# Patient Record
Sex: MALE | Race: WHITE | NOT HISPANIC OR LATINO | Employment: OTHER | URBAN - METROPOLITAN AREA
[De-identification: names, ages, dates, MRNs, and addresses within clinical notes are randomized per-mention and may not be internally consistent; named-entity substitution may affect disease eponyms.]

---

## 2023-04-18 ENCOUNTER — APPOINTMENT (OUTPATIENT)
Dept: LAB | Facility: LAB | Age: 73
End: 2023-04-18
Payer: MEDICARE

## 2023-04-18 LAB
ALANINE AMINOTRANSFERASE (SGPT) (U/L) IN SER/PLAS: 26 U/L (ref 10–52)
ALBUMIN (G/DL) IN SER/PLAS: 4.2 G/DL (ref 3.4–5)
ALKALINE PHOSPHATASE (U/L) IN SER/PLAS: 101 U/L (ref 33–136)
ANION GAP IN SER/PLAS: 12 MMOL/L (ref 10–20)
ASPARTATE AMINOTRANSFERASE (SGOT) (U/L) IN SER/PLAS: 30 U/L (ref 9–39)
BILIRUBIN TOTAL (MG/DL) IN SER/PLAS: 0.9 MG/DL (ref 0–1.2)
C REACTIVE PROTEIN (MG/L) IN SER/PLAS: 0.55 MG/DL
CALCIUM (MG/DL) IN SER/PLAS: 9 MG/DL (ref 8.6–10.3)
CARBON DIOXIDE, TOTAL (MMOL/L) IN SER/PLAS: 30 MMOL/L (ref 21–32)
CHLORIDE (MMOL/L) IN SER/PLAS: 104 MMOL/L (ref 98–107)
CREATININE (MG/DL) IN SER/PLAS: 0.91 MG/DL (ref 0.5–1.3)
ERYTHROCYTE DISTRIBUTION WIDTH (RATIO) BY AUTOMATED COUNT: 14.1 % (ref 11.5–14.5)
ERYTHROCYTE MEAN CORPUSCULAR HEMOGLOBIN CONCENTRATION (G/DL) BY AUTOMATED: 30.9 G/DL (ref 32–36)
ERYTHROCYTE MEAN CORPUSCULAR VOLUME (FL) BY AUTOMATED COUNT: 96 FL (ref 80–100)
ERYTHROCYTES (10*6/UL) IN BLOOD BY AUTOMATED COUNT: 4.87 X10E12/L (ref 4.5–5.9)
GFR MALE: 90 ML/MIN/1.73M2
GLUCOSE (MG/DL) IN SER/PLAS: 78 MG/DL (ref 74–99)
HEMATOCRIT (%) IN BLOOD BY AUTOMATED COUNT: 46.9 % (ref 41–52)
HEMOGLOBIN (G/DL) IN BLOOD: 14.5 G/DL (ref 13.5–17.5)
LEUKOCYTES (10*3/UL) IN BLOOD BY AUTOMATED COUNT: 4.9 X10E9/L (ref 4.4–11.3)
PLATELETS (10*3/UL) IN BLOOD AUTOMATED COUNT: 128 X10E9/L (ref 150–450)
POTASSIUM (MMOL/L) IN SER/PLAS: 4.1 MMOL/L (ref 3.5–5.3)
PROTEIN TOTAL: 7.1 G/DL (ref 6.4–8.2)
SEDIMENTATION RATE, ERYTHROCYTE: 32 MM/H (ref 0–20)
SODIUM (MMOL/L) IN SER/PLAS: 142 MMOL/L (ref 136–145)
UREA NITROGEN (MG/DL) IN SER/PLAS: 20 MG/DL (ref 6–23)

## 2023-10-12 ENCOUNTER — LAB (OUTPATIENT)
Dept: LAB | Facility: LAB | Age: 73
End: 2023-10-12
Payer: MEDICARE

## 2023-10-12 DIAGNOSIS — E78.5 DYSLIPIDEMIA: Primary | ICD-10-CM

## 2023-10-12 DIAGNOSIS — E78.5 DYSLIPIDEMIA: ICD-10-CM

## 2023-10-12 LAB
CHOLEST SERPL-MCNC: 126 MG/DL (ref 0–199)
CHOLESTEROL/HDL RATIO: 3.3
HDLC SERPL-MCNC: 38.6 MG/DL
LDLC SERPL CALC-MCNC: 62 MG/DL
NON HDL CHOLESTEROL: 87 MG/DL (ref 0–149)
TRIGL SERPL-MCNC: 127 MG/DL (ref 0–149)
VLDL: 25 MG/DL (ref 0–40)

## 2023-10-12 PROCEDURE — 80061 LIPID PANEL: CPT

## 2023-10-12 PROCEDURE — 36415 COLL VENOUS BLD VENIPUNCTURE: CPT

## 2023-10-13 RX ORDER — PRAVASTATIN SODIUM 20 MG/1
20 TABLET ORAL DAILY
Qty: 90 TABLET | Refills: 3 | Status: SHIPPED | OUTPATIENT
Start: 2023-10-13

## 2024-01-24 ENCOUNTER — LAB (OUTPATIENT)
Dept: LAB | Facility: LAB | Age: 74
End: 2024-01-24
Payer: MEDICARE

## 2024-01-24 ENCOUNTER — OFFICE VISIT (OUTPATIENT)
Dept: RHEUMATOLOGY | Facility: CLINIC | Age: 74
End: 2024-01-24
Payer: MEDICARE

## 2024-01-24 VITALS
BODY MASS INDEX: 28.58 KG/M2 | WEIGHT: 211 LBS | SYSTOLIC BLOOD PRESSURE: 169 MMHG | HEART RATE: 74 BPM | DIASTOLIC BLOOD PRESSURE: 85 MMHG | HEIGHT: 72 IN

## 2024-01-24 DIAGNOSIS — M19.90 INFLAMMATORY ARTHRITIS: ICD-10-CM

## 2024-01-24 DIAGNOSIS — Z79.899 ENCOUNTER FOR LONG-TERM (CURRENT) USE OF HIGH-RISK MEDICATION: ICD-10-CM

## 2024-01-24 DIAGNOSIS — M19.90 INFLAMMATORY ARTHRITIS: Primary | ICD-10-CM

## 2024-01-24 LAB
ALBUMIN SERPL BCP-MCNC: 4.4 G/DL (ref 3.4–5)
ALP SERPL-CCNC: 84 U/L (ref 33–136)
ALT SERPL W P-5'-P-CCNC: 26 U/L (ref 10–52)
ANION GAP SERPL CALC-SCNC: 12 MMOL/L (ref 10–20)
AST SERPL W P-5'-P-CCNC: 31 U/L (ref 9–39)
BILIRUB SERPL-MCNC: 0.6 MG/DL (ref 0–1.2)
BUN SERPL-MCNC: 24 MG/DL (ref 6–23)
CALCIUM SERPL-MCNC: 9.1 MG/DL (ref 8.6–10.3)
CHLORIDE SERPL-SCNC: 105 MMOL/L (ref 98–107)
CO2 SERPL-SCNC: 30 MMOL/L (ref 21–32)
CREAT SERPL-MCNC: 0.91 MG/DL (ref 0.5–1.3)
CRP SERPL-MCNC: 1.08 MG/DL
EGFRCR SERPLBLD CKD-EPI 2021: 90 ML/MIN/1.73M*2
ERYTHROCYTE [DISTWIDTH] IN BLOOD BY AUTOMATED COUNT: 14.7 % (ref 11.5–14.5)
ERYTHROCYTE [SEDIMENTATION RATE] IN BLOOD BY WESTERGREN METHOD: 13 MM/H (ref 0–20)
GLUCOSE SERPL-MCNC: 81 MG/DL (ref 74–99)
HCT VFR BLD AUTO: 46 % (ref 41–52)
HGB BLD-MCNC: 14 G/DL (ref 13.5–17.5)
MCH RBC QN AUTO: 30.7 PG (ref 26–34)
MCHC RBC AUTO-ENTMCNC: 30.4 G/DL (ref 32–36)
MCV RBC AUTO: 101 FL (ref 80–100)
NRBC BLD-RTO: 0 /100 WBCS (ref 0–0)
PLATELET # BLD AUTO: 131 X10*3/UL (ref 150–450)
POTASSIUM SERPL-SCNC: 3.9 MMOL/L (ref 3.5–5.3)
PROT SERPL-MCNC: 6.9 G/DL (ref 6.4–8.2)
RBC # BLD AUTO: 4.56 X10*6/UL (ref 4.5–5.9)
SODIUM SERPL-SCNC: 143 MMOL/L (ref 136–145)
WBC # BLD AUTO: 4.5 X10*3/UL (ref 4.4–11.3)

## 2024-01-24 PROCEDURE — 86140 C-REACTIVE PROTEIN: CPT

## 2024-01-24 PROCEDURE — 85027 COMPLETE CBC AUTOMATED: CPT

## 2024-01-24 PROCEDURE — 85652 RBC SED RATE AUTOMATED: CPT

## 2024-01-24 PROCEDURE — 80053 COMPREHEN METABOLIC PANEL: CPT

## 2024-01-24 PROCEDURE — 1036F TOBACCO NON-USER: CPT | Performed by: INTERNAL MEDICINE

## 2024-01-24 PROCEDURE — 36415 COLL VENOUS BLD VENIPUNCTURE: CPT

## 2024-01-24 PROCEDURE — 1126F AMNT PAIN NOTED NONE PRSNT: CPT | Performed by: INTERNAL MEDICINE

## 2024-01-24 PROCEDURE — 99213 OFFICE O/P EST LOW 20 MIN: CPT | Performed by: INTERNAL MEDICINE

## 2024-01-24 RX ORDER — SYRINGE,SAFETY WITH NEEDLE,1ML 25GX1"
SYRINGE (EA) MISCELLANEOUS
COMMUNITY
Start: 2023-10-05 | End: 2024-01-24

## 2024-01-24 RX ORDER — METHOTREXATE 25 MG/ML
INJECTION, SOLUTION INTRA-ARTERIAL; INTRAMUSCULAR; INTRAVENOUS
COMMUNITY
End: 2024-01-24 | Stop reason: SDUPTHER

## 2024-01-24 RX ORDER — OMEPRAZOLE 40 MG/1
40 CAPSULE, DELAYED RELEASE ORAL EVERY 24 HOURS
COMMUNITY
Start: 2015-10-28

## 2024-01-24 RX ORDER — SYRINGE,SAFETY WITH NEEDLE,1ML 25GX1"
SYRINGE (EA) MISCELLANEOUS
Qty: 100 EACH | Refills: 12 | Status: SHIPPED | OUTPATIENT
Start: 2024-01-24 | End: 2025-01-23

## 2024-01-24 RX ORDER — METHOTREXATE 25 MG/ML
INJECTION, SOLUTION INTRA-ARTERIAL; INTRAMUSCULAR; INTRAVENOUS
Qty: 12 ML | Refills: 3 | Status: SHIPPED | OUTPATIENT
Start: 2024-01-24

## 2024-01-24 RX ORDER — FOLIC ACID 1 MG/1
1 TABLET ORAL EVERY 24 HOURS
COMMUNITY
Start: 2015-10-28 | End: 2024-04-08

## 2024-01-24 ASSESSMENT — ENCOUNTER SYMPTOMS
DEPRESSION: 0
ABDOMINAL DISTENTION: 0
FATIGUE: 1
SHORTNESS OF BREATH: 0
OCCASIONAL FEELINGS OF UNSTEADINESS: 0
LOSS OF SENSATION IN FEET: 0

## 2024-01-24 NOTE — PROGRESS NOTES
Subjective   Patient ID: Trenton Sandra is a 73 y.o. male who presents for Follow-up.  HPI  Patient with history of seronegative inflammatory arthritis.  Previously had been on Plaquenil.    At his last visit on 4/18/2023 we had him continue with methotrexate.  We discussed about decreasing his methotrexate dose.  At his last visit on 4/18/2023 he had been reducing his methotrexate.  When I saw him he was at 0.4 mL and he wanted to decrease more.  He says that he did not get very far and started having increase in symptoms.  He started having lower back pain especially after 2 vacations in which she did not sleep in his own bed and had a ride in the car for long periods of time.    He had trigger finger release in the fourth digit on the right with Dr. Wilkinson.  He still can feel that it is slightly full but it is not sticking like it had been before.    Denies any infections.    He has been having lower back pain more on the left lower side.  He has been using a heating pad and lumbar pillow.  Otherwise nothing else new with his health.  Review of Systems   Constitutional:  Positive for fatigue.   Respiratory:  Negative for shortness of breath.    Cardiovascular:  Negative for chest pain.   Gastrointestinal:  Negative for abdominal distention.   Musculoskeletal:         Per HPI       Objective   Physical Exam  GEN: NAD A&O x3 appropriate affect  HENT: no enlarged glands or thyroid  EYES: no conjunctival redness, eyelids normal,  LYMPH: no cervical lymphadenopathy  SKIN: no rashes  PULSES: +radials  MSK:   No current swelling in the MCPs or PIPs mild decreased range of motion of the wrist. No current tenderness in the elbows or shoulders today.   No current swelling in the knees some mild hypertrophic change more in the right ankle than the left.   Assessment/Plan     inflammatory arthritis -previously had been on Plaquenil, Humira and Xeljanz.   -Currently back up to 0.8 mL of methotrexate.  Will have him get  blood work today.  His back issues may be separate from his rheumatoid issues.  Discussed about further imaging and sending him to physical therapy but he feels like he is getting better and defers these referrals.    We can see him back again in 6 months or as needed.          Seda Huang MD 01/24/24 11:26 AM

## 2024-01-31 ENCOUNTER — APPOINTMENT (OUTPATIENT)
Dept: RHEUMATOLOGY | Facility: CLINIC | Age: 74
End: 2024-01-31
Payer: MEDICARE

## 2024-04-07 DIAGNOSIS — M19.90 UNSPECIFIED OSTEOARTHRITIS, UNSPECIFIED SITE: ICD-10-CM

## 2024-04-08 RX ORDER — FOLIC ACID 1 MG/1
1 TABLET ORAL DAILY
Qty: 90 TABLET | Refills: 3 | Status: SHIPPED | OUTPATIENT
Start: 2024-04-08

## 2024-04-13 PROBLEM — U07.1 COVID-19: Status: ACTIVE | Noted: 2024-04-13

## 2024-04-13 PROBLEM — E78.2 MIXED HYPERLIPIDEMIA: Status: ACTIVE | Noted: 2022-10-21

## 2024-04-13 PROBLEM — N20.0 CALCULUS OF KIDNEY: Status: ACTIVE | Noted: 2024-04-13

## 2024-04-13 PROBLEM — N40.1 LOWER URINARY TRACT SYMPTOMS DUE TO BENIGN PROSTATIC HYPERPLASIA: Status: ACTIVE | Noted: 2024-04-13

## 2024-04-13 PROBLEM — M85.80 OSTEOPENIA: Status: ACTIVE | Noted: 2024-04-13

## 2024-04-13 PROBLEM — E78.5 DYSLIPIDEMIA: Status: ACTIVE | Noted: 2024-04-13

## 2024-04-13 PROBLEM — M19.90 INFLAMMATORY ARTHRITIS: Status: ACTIVE | Noted: 2021-10-26

## 2024-04-13 PROBLEM — D72.819 LEUKOPENIA: Status: ACTIVE | Noted: 2024-04-13

## 2024-04-13 PROBLEM — K21.9 GERD (GASTROESOPHAGEAL REFLUX DISEASE): Status: ACTIVE | Noted: 2024-04-13

## 2024-04-13 PROBLEM — N52.9 MALE ERECTILE DISORDER: Status: ACTIVE | Noted: 2024-04-13

## 2024-04-18 ENCOUNTER — APPOINTMENT (OUTPATIENT)
Dept: PRIMARY CARE | Facility: CLINIC | Age: 74
End: 2024-04-18
Payer: MEDICARE

## 2024-04-22 ENCOUNTER — APPOINTMENT (OUTPATIENT)
Dept: PRIMARY CARE | Facility: CLINIC | Age: 74
End: 2024-04-22
Payer: MEDICARE

## 2024-05-22 NOTE — PROGRESS NOTES
"Subjective   Patient ID: Trenton Sandra is a 74 y.o. male who presents for Establish Care.    HPI   Pt here to re-establishl; last seen in 2021.  PMH sig for hyperlipidemia, bph, and sees rheum for RA, PMR, osteopenia.   Had recent labs: see below    Past Medical History:   Diagnosis Date   • BPH (benign prostatic hyperplasia)     [place]   • COVID-19 04/13/2024   • Dyslipidemia    • History of COVID-19    • History of duodenal ulcer    • History of iron deficiency anemia    • History of upper gastrointestinal bleeding    • Kidney stone     left; [place]   • Leukopenia 04/13/2024   • Osteopenia     [place]   • PMR (polymyalgia rheumatica) ([facility])     [place]   • Rheumatoid arthritis ([facility])     [place]     Current Outpatient Medications   Medication Sig Dispense Refill   • acetaminophen (Tylenol 8 HOUR) 650 mg ER tablet Take 1 tablet (650 mg) by mouth every 8 hours if needed for mild pain (1 - 3). Do not crush, chew, or split.     • folic acid (Folvite) 1 mg tablet TAKE 1 TABLET BY MOUTH EVERY DAY 90 tablet 3   • insulin syringe-needle U-100 (BD Insulin Syringe Ultra-Fine) 31G X 5/16\" 1 mL syringe Use as instructed 100 each 12   • methotrexate 25 mg/mL injection Inject 0.8mL subcutaneous once a week 12 mL 3   • omeprazole (PriLOSEC) 40 mg DR capsule Take 1 capsule (40 mg) by mouth once every 24 hours.     • pravastatin (Pravachol) 20 mg tablet Take 1 tablet (20 mg) by mouth once daily. 90 tablet 3     No current facility-administered medications for this visit.       Review of Systems  LABS:    Lab Results   Component Value Date    GLUCOSE 81 01/24/2024    CALCIUM 9.1 01/24/2024     01/24/2024    K 3.9 01/24/2024    CO2 30 01/24/2024     01/24/2024    BUN 24 (H) 01/24/2024    CREATININE 0.91 01/24/2024     Lab Results   Component Value Date    WBC 4.5 01/24/2024    HGB 14.0 01/24/2024    HCT 46.0 01/24/2024     (H) 01/24/2024     (L) 01/24/2024     Lab Results   Component Value " Date    CHOL 126 10/12/2023    CHOL 149 10/21/2022    CHOL 165 10/26/2021     Lab Results   Component Value Date    HDL 38.6 10/12/2023    HDL 47 10/21/2022    HDL 44 10/26/2021     Lab Results   Component Value Date    LDLCALC 62 10/12/2023    LDLCALC 83 10/21/2022    LDLCALC 95 10/26/2021     Lab Results   Component Value Date    TRIG 127 10/12/2023    TRIG 95 10/21/2022    TRIG 130 10/26/2021     Lab Results   Component Value Date    SEDRATE 13 01/24/2024     Lab Results   Component Value Date    CRP 1.08 (H) 01/24/2024        Objective   /74 (BP Location: Left arm)   Pulse 74   Wt 95.8 kg (211 lb 3.2 oz)   SpO2 98%   BMI 28.64 kg/m²     Physical Exam  Vitals reviewed.   Constitutional:       Appearance: Normal appearance.   HENT:      Head: Normocephalic and atraumatic.      Right Ear: Tympanic membrane, ear canal and external ear normal.      Left Ear: Tympanic membrane, ear canal and external ear normal.      Mouth/Throat:      Mouth: Mucous membranes are moist.   Eyes:      Extraocular Movements: Extraocular movements intact.      Pupils: Pupils are equal, round, and reactive to light.   Cardiovascular:      Rate and Rhythm: Normal rate and regular rhythm.      Heart sounds: Normal heart sounds.   Pulmonary:      Effort: Pulmonary effort is normal.      Breath sounds: Normal breath sounds.   Musculoskeletal:         General: Normal range of motion.      Cervical back: Normal range of motion.   Lymphadenopathy:      Cervical: No cervical adenopathy.   Skin:     General: Skin is warm.   Neurological:      General: No focal deficit present.      Mental Status: He is alert and oriented to person, place, and time.      Deep Tendon Reflexes: Reflexes are normal and symmetric.   Psychiatric:         Behavior: Behavior normal.         Judgment: Judgment normal.       Assessment/Plan   Problem List Items Addressed This Visit             ICD-10-CM       High    Mixed hyperlipidemia - Primary E78.2    Relevant  Orders    Lipid Panel     Other Visit Diagnoses         Codes    Benign prostatic hyperplasia, unspecified whether lower urinary tract symptoms present     N40.0    Relevant Orders    Prostate Specific Antigen    Need for vaccination     Z23    Relevant Orders    Pneumococcal conjugate vaccine, 20-valent (PREVNAR 20)

## 2024-06-06 RX ORDER — METHOTREXATE 25 MG/ML
INJECTION INTRA-ARTERIAL; INTRAMUSCULAR; INTRATHECAL; INTRAVENOUS
COMMUNITY
Start: 2024-01-24 | End: 2024-06-20 | Stop reason: SDUPTHER

## 2024-06-20 ENCOUNTER — LAB (OUTPATIENT)
Dept: LAB | Facility: LAB | Age: 74
End: 2024-06-20
Payer: MEDICARE

## 2024-06-20 ENCOUNTER — OFFICE VISIT (OUTPATIENT)
Dept: PRIMARY CARE | Facility: CLINIC | Age: 74
End: 2024-06-20
Payer: MEDICARE

## 2024-06-20 VITALS
OXYGEN SATURATION: 98 % | DIASTOLIC BLOOD PRESSURE: 74 MMHG | WEIGHT: 211.2 LBS | SYSTOLIC BLOOD PRESSURE: 130 MMHG | HEART RATE: 74 BPM | BODY MASS INDEX: 28.64 KG/M2

## 2024-06-20 DIAGNOSIS — E78.2 MIXED HYPERLIPIDEMIA: ICD-10-CM

## 2024-06-20 DIAGNOSIS — N40.0 BENIGN PROSTATIC HYPERPLASIA, UNSPECIFIED WHETHER LOWER URINARY TRACT SYMPTOMS PRESENT: ICD-10-CM

## 2024-06-20 DIAGNOSIS — Z23 NEED FOR VACCINATION: ICD-10-CM

## 2024-06-20 DIAGNOSIS — E78.2 MIXED HYPERLIPIDEMIA: Primary | ICD-10-CM

## 2024-06-20 PROBLEM — U07.1 COVID-19: Status: RESOLVED | Noted: 2024-04-13 | Resolved: 2024-06-20

## 2024-06-20 PROBLEM — D72.819 LEUKOPENIA: Status: RESOLVED | Noted: 2024-04-13 | Resolved: 2024-06-20

## 2024-06-20 PROBLEM — N20.0 CALCULUS OF KIDNEY: Status: RESOLVED | Noted: 2024-04-13 | Resolved: 2024-06-20

## 2024-06-20 LAB
CHOLEST SERPL-MCNC: 142 MG/DL (ref 0–199)
CHOLESTEROL/HDL RATIO: 4
HDLC SERPL-MCNC: 35.9 MG/DL
LDLC SERPL CALC-MCNC: 84 MG/DL
NON HDL CHOLESTEROL: 106 MG/DL (ref 0–149)
PSA SERPL-MCNC: 0.79 NG/ML
TRIGL SERPL-MCNC: 109 MG/DL (ref 0–149)
VLDL: 22 MG/DL (ref 0–40)

## 2024-06-20 PROCEDURE — 1170F FXNL STATUS ASSESSED: CPT | Performed by: FAMILY MEDICINE

## 2024-06-20 PROCEDURE — 90677 PCV20 VACCINE IM: CPT | Performed by: FAMILY MEDICINE

## 2024-06-20 PROCEDURE — 84153 ASSAY OF PSA TOTAL: CPT

## 2024-06-20 PROCEDURE — 1124F ACP DISCUSS-NO DSCNMKR DOCD: CPT | Performed by: FAMILY MEDICINE

## 2024-06-20 PROCEDURE — 1159F MED LIST DOCD IN RCRD: CPT | Performed by: FAMILY MEDICINE

## 2024-06-20 PROCEDURE — 1160F RVW MEDS BY RX/DR IN RCRD: CPT | Performed by: FAMILY MEDICINE

## 2024-06-20 PROCEDURE — 1125F AMNT PAIN NOTED PAIN PRSNT: CPT | Performed by: FAMILY MEDICINE

## 2024-06-20 PROCEDURE — 99214 OFFICE O/P EST MOD 30 MIN: CPT | Performed by: FAMILY MEDICINE

## 2024-06-20 PROCEDURE — 99214 OFFICE O/P EST MOD 30 MIN: CPT | Mod: 25 | Performed by: FAMILY MEDICINE

## 2024-06-20 PROCEDURE — 1036F TOBACCO NON-USER: CPT | Performed by: FAMILY MEDICINE

## 2024-06-20 PROCEDURE — 36415 COLL VENOUS BLD VENIPUNCTURE: CPT

## 2024-06-20 RX ORDER — DEXTROMETHORPHAN HYDROBROMIDE, GUAIFENESIN 5; 100 MG/5ML; MG/5ML
650 LIQUID ORAL EVERY 8 HOURS PRN
COMMUNITY

## 2024-06-20 ASSESSMENT — ACTIVITIES OF DAILY LIVING (ADL)
TOILETING: INDEPENDENT
PATIENT'S MEMORY ADEQUATE TO SAFELY COMPLETE DAILY ACTIVITIES?: YES
GROOMING: INDEPENDENT
BATHING: INDEPENDENT
HEARING - LEFT EAR: FUNCTIONAL
DRESSING YOURSELF: INDEPENDENT
HEARING - RIGHT EAR: FUNCTIONAL
FEEDING YOURSELF: INDEPENDENT
JUDGMENT_ADEQUATE_SAFELY_COMPLETE_DAILY_ACTIVITIES: YES
WALKS IN HOME: INDEPENDENT
ADEQUATE_TO_COMPLETE_ADL: YES

## 2024-06-20 ASSESSMENT — PATIENT HEALTH QUESTIONNAIRE - PHQ9
1. LITTLE INTEREST OR PLEASURE IN DOING THINGS: NOT AT ALL
2. FEELING DOWN, DEPRESSED OR HOPELESS: NOT AT ALL
SUM OF ALL RESPONSES TO PHQ9 QUESTIONS 1 AND 2: 0

## 2024-06-20 ASSESSMENT — PAIN SCALES - GENERAL: PAINLEVEL: 5

## 2024-06-20 NOTE — PATIENT INSTRUCTIONS
PAIN MANAGEMENT IN THE ELDERLY  What is pain? Pain is how your body reacts to injury or illness, even as you get older. Pain is not something that normally happens as you age. What you think is painful may not be painful to someone else. Everybody reacts to pain in different ways. But, pain is whatever you say it is! No matter how mild or strong your pain is, you have the right to be “comfortable”. But you need to tell your caregiver that you have pain, so together you can work on treatment options.   The following are some things that are NOT true about getting older and pain.  I should expect to have pain because it is just a part of getting older.  If I tell my caregiver about my pain, he will think I am complaining. You have the right just as anyone else does to be comfortable. Your caregiver cannot treat your pain if you do not tell him about it.  If I tell my caregiver about my pain he will not listen or take me seriously.  If I take pain medicines, I will feel “drugged up” or “doped up”. There are many new medicines now that lessen pain without making you feel “drugged up” when taking them.  Older people should only take pain medicines if their pain is very bad. You have the right to be comfortable no matter if you have a little or a lot of pain.  Pain medicines are addictive and if I take them I will get “hooked”. An addicted person is someone who takes medicines to “get high”. You cannot get addicted to pain medicines if you are taking them to make your pain go away. A person who has diabetes takes their medicine to keep the diabetes I control. It is the same with your pain. You take pain medicines to keep your pain in control and to live a normal life.  What causes pain? Pain can be caused by many things, such as injury, surgery, or disease. Some pain is caused by pressure on a nerve, such as a cancerous tumor. Other pain is caused when nerves are cut as I an accident or surgery. Old injuries that may not  have bothered you may get re?injured, or cause new injuries. You may not want to move the painful part of your body at all. But, you may have pain because you are not moving this body part. But sometimes there is no clear cause for pain.    What are the different types of pain?  Acute pain is short?lived and usually lasts less than 3 months. Caregivers first work to remove the cause of the pain, such fixing a broken arm or leg. Acute pain can usually be controlled or stopped with pain medicine.  Chronic pain lasts longer than 3 to 6 months. Almost 4 out of 5 persons over 65 regularly take medications for chronic pain, and half of this group has seen more than 3 doctors in the past 5 years. This kind of pain is often more complex. Caregivers may use medicines along with other treatments, like physical and relaxation therapies to help your pain.    What is your pain like? Caregivers want you to talk to them about your pain. This helps them learn what may be causing the pain and how to best treat it. You need to tell your caregivers if you have trouble hearing their questions or seeing things. Caregivers can use special tools and ways to better understand their questions about your pain.    What if I cannot talk? Sometimes you may not be able to speak about your pain. You may have illness or injuries like dementia, brain damage, or a stroke. This makes it very hard for your caregivers and family to know you are in pain. Your family may help caregivers understand your pain by watching for physical signs of pain. This means you may act normal or opposite of how your family thinks you should act even though you are having very bad pain.     The following are some signs that your family can watch for that may tell them you are in pain.  If you are normally loud and noisy, you may get very quiet and withdrawn. You may also stop doing activities you used to do.  If you are very quiet and withdrawn, you may get loud, act  stubborn, and hit people.  You may not eat what you normally do. Or, you may only want to drink or eat soft foods.  Suddenly, you may not do all the activities you used to do.  You may lose control of your bowel or bladder.  You may be very depressed and have a sad face.  If you do not talk at all, you may blink your eyes very fast much of the time. You may also grimace.  If you have been very easy going and happy, you may begin to be very sensitive and cry easily.  You might start to walk or move differently than before. You may suddenly stop walking or start pacing all the time.  You may have your knees drawn up to your chest and rock like a baby.  You may touch, rub, pull or pick at a body part that is hurting.  You may start to pull away from peoples’ touch and protect your arms and legs.  You may suddenly begin to fall when you had no problems before.  You may sleep more than usual.  You may start to whimper or groan quietly.  You may become very confused suddenly when there was no problem before.  Why is pain control important? Pain can affect your appetite, how well you sleep, your energy level and ability to do things. Pain can also affect your mood and relationship with others. If caregivers can help you control your pain, you will suffer less and can even heal faster.  Medicines:  Anti?anxiety medicine: this medicine may be given to help you feel less nervous. It may be given by IV, as a shot/injection or by mouth.  Anti?convulsing: this medicine is given to control seizures. It can also be used to treat kinds of chronic nerve pain and may help control your mood swings. It is given by IV, shot/injection or by mouth.  Muscle relaxers: you may need medicine to help your muscles relax. This medicine can be given by IV, shot/injection or by mouth. Muscle relaxers can make you feel dizzy or weak. Call your caregiver if you need help getting out of bed.  NSAIDS: this medicine may be given to decrease  inflammation, which is redness, pain and swelling. It is very good for chronic bone pain that comes from arthritis or cancer. It is given by mouth or inhaled in your nose.  Pain medicine: this medicine affects the nervous system so you feel less pain. Your caregiver will tell you how much to take and how often. Take the medicine regularly as directed by your  caregiver. Do not wait until the pain is too bad. The medicine may not work as well at controlling the pain if you wait too long to take it. Tell caregivers if the pain does not go away or comes back.  Steroids: this medicine may be given to decrease inflammation. There are many different reasons to take steroids. This medication can help a lot but may also have side effects. Be sure  you understand why you need steroids. Don’t stop taking this medicine without your caregiver’s ok. Stopping on your own can cause problems.  Tell your caregiver all medications that you are taking, including over the counter meds and herbals.    How can pain medicine be given? The following are the many different ways pain medicine can be given depending on the kind of pain you are having.  By mouth - you may be given pills or liquid to swallow or you may be given a pill or liquid to put under your tongue. Some medicine can also be given as a lozenge like a cough drop or even as a special lollipop.  Rectal - medicine in a suppository is put into your rectum.  IV - pain medicine can be given as a shot/injection in an IV, into a muscle, or under the skin.  Transdermal - some medicine can be given as a patch that is placed on your skin. This medicine is released slowly to give pain relief for as long as 72 hours.  How can you take pain medicine safely and make it work best for you?  DO NOT wait until you are in pain to take your medicine if your caregiver has suggested a regular schedule around the clock. If you wait until you feel pain, you will only be “chasing” your pain and not  controlling it.  Some pain medicines can make you breathe less deeply and less often. The medicine may also make you sleepy, dizzy, and unsafe to drive a car or use heavy equipment. For these reasons, it is very important to follow your caregiver’s advice on how to use this medicine.  Some foods, alcohol and other medicines may cause unpleasant side effects when you take pain medicine. Follow your caregiver’s advice about how to prevent these problems.  Pain medicine and NSAIDS can make you constipated. Contact your caregiver if you are experiencing constipation.  Do not stop talking pain medicine suddenly if you have been taking it longer than 2 weeks. Your body may have become used to the medicine. Stopping the medicine all at once may cause unpleasant or dangerous side effects.  With time you may feel that the pain medicine is not working as well as it did before. Call your caregiver if this happens. Together you can find new ways to control the pain.    Other non?drug control methods: there are many pain control techniques that can help you deal with pain even if it does not go away completely. It is important to practice some of the techniques whe you do not have pain if possible. This will help the technique work well during an attack of pain.  Breathing exercises.  Environment: being in a quiet place may make it easier for you to deal with the pain. Avoiding bright lights or loud, noisy places can also help control the pain. Making sure your home is not too hot or too cold may also lessen pain.

## 2024-07-16 ENCOUNTER — APPOINTMENT (OUTPATIENT)
Dept: RHEUMATOLOGY | Facility: CLINIC | Age: 74
End: 2024-07-16
Payer: MEDICARE

## 2024-07-16 ENCOUNTER — LAB (OUTPATIENT)
Dept: LAB | Facility: LAB | Age: 74
End: 2024-07-16
Payer: MEDICARE

## 2024-07-16 VITALS
WEIGHT: 209 LBS | HEIGHT: 72 IN | HEART RATE: 85 BPM | BODY MASS INDEX: 28.31 KG/M2 | SYSTOLIC BLOOD PRESSURE: 159 MMHG | DIASTOLIC BLOOD PRESSURE: 90 MMHG

## 2024-07-16 DIAGNOSIS — M19.90 INFLAMMATORY ARTHRITIS: ICD-10-CM

## 2024-07-16 DIAGNOSIS — Z79.899 ENCOUNTER FOR LONG-TERM (CURRENT) USE OF MEDICATIONS: ICD-10-CM

## 2024-07-16 LAB
ALBUMIN SERPL BCP-MCNC: 4.3 G/DL (ref 3.4–5)
ALP SERPL-CCNC: 84 U/L (ref 33–136)
ALT SERPL W P-5'-P-CCNC: 27 U/L (ref 10–52)
ANION GAP SERPL CALC-SCNC: 14 MMOL/L (ref 10–20)
AST SERPL W P-5'-P-CCNC: 30 U/L (ref 9–39)
BILIRUB SERPL-MCNC: 0.7 MG/DL (ref 0–1.2)
BUN SERPL-MCNC: 24 MG/DL (ref 6–23)
CALCIUM SERPL-MCNC: 8.8 MG/DL (ref 8.6–10.3)
CHLORIDE SERPL-SCNC: 108 MMOL/L (ref 98–107)
CO2 SERPL-SCNC: 25 MMOL/L (ref 21–32)
CREAT SERPL-MCNC: 1 MG/DL (ref 0.5–1.3)
CRP SERPL-MCNC: 0.36 MG/DL
EGFRCR SERPLBLD CKD-EPI 2021: 79 ML/MIN/1.73M*2
ERYTHROCYTE [DISTWIDTH] IN BLOOD BY AUTOMATED COUNT: 14.6 % (ref 11.5–14.5)
ERYTHROCYTE [SEDIMENTATION RATE] IN BLOOD BY WESTERGREN METHOD: 26 MM/H (ref 0–20)
GLUCOSE SERPL-MCNC: 97 MG/DL (ref 74–99)
HCT VFR BLD AUTO: 45.9 % (ref 41–52)
HGB BLD-MCNC: 14.2 G/DL (ref 13.5–17.5)
MCH RBC QN AUTO: 30.4 PG (ref 26–34)
MCHC RBC AUTO-ENTMCNC: 30.9 G/DL (ref 32–36)
MCV RBC AUTO: 98 FL (ref 80–100)
NRBC BLD-RTO: 0 /100 WBCS (ref 0–0)
PLATELET # BLD AUTO: 120 X10*3/UL (ref 150–450)
POTASSIUM SERPL-SCNC: 3.9 MMOL/L (ref 3.5–5.3)
PROT SERPL-MCNC: 7 G/DL (ref 6.4–8.2)
RBC # BLD AUTO: 4.67 X10*6/UL (ref 4.5–5.9)
SODIUM SERPL-SCNC: 143 MMOL/L (ref 136–145)
WBC # BLD AUTO: 4.8 X10*3/UL (ref 4.4–11.3)

## 2024-07-16 PROCEDURE — 36415 COLL VENOUS BLD VENIPUNCTURE: CPT

## 2024-07-16 PROCEDURE — 99213 OFFICE O/P EST LOW 20 MIN: CPT | Performed by: INTERNAL MEDICINE

## 2024-07-16 PROCEDURE — 85652 RBC SED RATE AUTOMATED: CPT

## 2024-07-16 PROCEDURE — 86140 C-REACTIVE PROTEIN: CPT

## 2024-07-16 PROCEDURE — 1159F MED LIST DOCD IN RCRD: CPT | Performed by: INTERNAL MEDICINE

## 2024-07-16 PROCEDURE — 85027 COMPLETE CBC AUTOMATED: CPT

## 2024-07-16 PROCEDURE — 80053 COMPREHEN METABOLIC PANEL: CPT

## 2024-07-16 RX ORDER — METHOTREXATE 25 MG/ML
INJECTION, SOLUTION INTRA-ARTERIAL; INTRAMUSCULAR; INTRAVENOUS
Qty: 12 ML | Refills: 3 | Status: SHIPPED | OUTPATIENT
Start: 2024-07-16

## 2024-07-16 ASSESSMENT — ENCOUNTER SYMPTOMS
SHORTNESS OF BREATH: 0
FATIGUE: 1
ABDOMINAL DISTENTION: 0

## 2024-07-16 NOTE — PROGRESS NOTES
Subjective   Patient ID: Trenton Sandra is a 74 y.o. male who presents for Follow-up (6 month follow up).  HPI  Patient with history of seronegative inflammatory arthritis.  Previously had been on Plaquenil.    Patient was last seen in January and at that time he was at 0.8 mg of methotrexate.  He self increased to 0.9 mL and has found that it has helped his back issues morning.  He has been doing yard work without any issues.  Denies any infections.      Has some questions about his methotrexate injection vitals.  Some preservative-free and some have preservatives.  Will get occasional symptoms down his left upper extremity which could be coming from his neck    Review of Systems   Constitutional:  Positive for fatigue.   Respiratory:  Negative for shortness of breath.    Cardiovascular:  Negative for chest pain.   Gastrointestinal:  Negative for abdominal distention.   Musculoskeletal:         Per HPI       Objective   Physical Exam  GEN: NAD A&O x3 appropriate affect  HENT: no enlarged glands or thyroid  EYES: no conjunctival redness, eyelids normal,  LYMPH: no cervical lymphadenopathy  SKIN: no rashes  PULSES: +radials  MSK:   No current swelling in the MCPs or PIPs mild decreased range of motion of the wrist. No current tenderness in the elbows or shoulders today.   No current swelling in the knees some mild hypertrophic change more in the right ankle than the left.   Assessment/Plan     inflammatory arthritis -previously had been on Plaquenil, Humira and Xeljanz.   -Currently on 0.9 mL of methotrexate.  Will have him do blood work      We can see him back again in 6 months or as needed.          Seda Huang MD 07/16/24 11:50 AM

## 2024-09-18 DIAGNOSIS — K21.9 GASTRO-ESOPHAGEAL REFLUX DISEASE WITHOUT ESOPHAGITIS: ICD-10-CM

## 2024-09-20 RX ORDER — OMEPRAZOLE 40 MG/1
40 CAPSULE, DELAYED RELEASE ORAL DAILY
Qty: 90 CAPSULE | Refills: 3 | Status: SHIPPED | OUTPATIENT
Start: 2024-09-20

## 2024-09-22 DIAGNOSIS — E78.5 DYSLIPIDEMIA: ICD-10-CM

## 2024-09-22 RX ORDER — PRAVASTATIN SODIUM 20 MG/1
20 TABLET ORAL DAILY
Qty: 90 TABLET | Refills: 2 | Status: SHIPPED | OUTPATIENT
Start: 2024-09-22

## 2025-01-14 ENCOUNTER — APPOINTMENT (OUTPATIENT)
Dept: RHEUMATOLOGY | Facility: CLINIC | Age: 75
End: 2025-01-14
Payer: MEDICARE

## 2025-01-14 ENCOUNTER — LAB (OUTPATIENT)
Dept: LAB | Facility: LAB | Age: 75
End: 2025-01-14
Payer: MEDICARE

## 2025-01-14 VITALS
SYSTOLIC BLOOD PRESSURE: 145 MMHG | BODY MASS INDEX: 30.8 KG/M2 | HEIGHT: 71 IN | HEART RATE: 75 BPM | WEIGHT: 220 LBS | DIASTOLIC BLOOD PRESSURE: 78 MMHG | OXYGEN SATURATION: 98 %

## 2025-01-14 DIAGNOSIS — M67.922 TENDINOPATHY OF LEFT BICEPS: ICD-10-CM

## 2025-01-14 DIAGNOSIS — M19.90 INFLAMMATORY ARTHRITIS: ICD-10-CM

## 2025-01-14 DIAGNOSIS — M19.90 INFLAMMATORY ARTHRITIS: Primary | ICD-10-CM

## 2025-01-14 DIAGNOSIS — Z79.899 ENCOUNTER FOR LONG-TERM (CURRENT) USE OF MEDICATIONS: ICD-10-CM

## 2025-01-14 LAB
ALBUMIN SERPL BCP-MCNC: 4.2 G/DL (ref 3.4–5)
ALP SERPL-CCNC: 81 U/L (ref 33–136)
ALT SERPL W P-5'-P-CCNC: 35 U/L (ref 10–52)
ANION GAP SERPL CALC-SCNC: 12 MMOL/L (ref 10–20)
AST SERPL W P-5'-P-CCNC: 26 U/L (ref 9–39)
BILIRUB SERPL-MCNC: 0.8 MG/DL (ref 0–1.2)
BUN SERPL-MCNC: 22 MG/DL (ref 6–23)
CALCIUM SERPL-MCNC: 8.6 MG/DL (ref 8.6–10.3)
CHLORIDE SERPL-SCNC: 107 MMOL/L (ref 98–107)
CO2 SERPL-SCNC: 28 MMOL/L (ref 21–32)
CREAT SERPL-MCNC: 0.86 MG/DL (ref 0.5–1.3)
CRP SERPL-MCNC: 0.32 MG/DL
EGFRCR SERPLBLD CKD-EPI 2021: >90 ML/MIN/1.73M*2
ERYTHROCYTE [DISTWIDTH] IN BLOOD BY AUTOMATED COUNT: 15.2 % (ref 11.5–14.5)
ERYTHROCYTE [SEDIMENTATION RATE] IN BLOOD BY WESTERGREN METHOD: 21 MM/H (ref 0–20)
GLUCOSE SERPL-MCNC: 114 MG/DL (ref 74–99)
HCT VFR BLD AUTO: 45.1 % (ref 41–52)
HGB BLD-MCNC: 13.7 G/DL (ref 13.5–17.5)
MCH RBC QN AUTO: 29.7 PG (ref 26–34)
MCHC RBC AUTO-ENTMCNC: 30.4 G/DL (ref 32–36)
MCV RBC AUTO: 98 FL (ref 80–100)
NRBC BLD-RTO: 0 /100 WBCS (ref 0–0)
PLATELET # BLD AUTO: 121 X10*3/UL (ref 150–450)
POTASSIUM SERPL-SCNC: 3.8 MMOL/L (ref 3.5–5.3)
PROT SERPL-MCNC: 6.6 G/DL (ref 6.4–8.2)
RBC # BLD AUTO: 4.61 X10*6/UL (ref 4.5–5.9)
SODIUM SERPL-SCNC: 143 MMOL/L (ref 136–145)
WBC # BLD AUTO: 3.9 X10*3/UL (ref 4.4–11.3)

## 2025-01-14 PROCEDURE — 80053 COMPREHEN METABOLIC PANEL: CPT

## 2025-01-14 PROCEDURE — G2211 COMPLEX E/M VISIT ADD ON: HCPCS | Performed by: INTERNAL MEDICINE

## 2025-01-14 PROCEDURE — 86140 C-REACTIVE PROTEIN: CPT

## 2025-01-14 PROCEDURE — 1159F MED LIST DOCD IN RCRD: CPT | Performed by: INTERNAL MEDICINE

## 2025-01-14 PROCEDURE — 3008F BODY MASS INDEX DOCD: CPT | Performed by: INTERNAL MEDICINE

## 2025-01-14 PROCEDURE — 99214 OFFICE O/P EST MOD 30 MIN: CPT | Performed by: INTERNAL MEDICINE

## 2025-01-14 PROCEDURE — 85652 RBC SED RATE AUTOMATED: CPT

## 2025-01-14 PROCEDURE — 85027 COMPLETE CBC AUTOMATED: CPT

## 2025-01-14 RX ORDER — METHOTREXATE 25 MG/ML
INJECTION, SOLUTION INTRAMUSCULAR; INTRATHECAL; INTRAVENOUS; SUBCUTANEOUS
Qty: 12 ML | Refills: 3 | Status: SHIPPED | OUTPATIENT
Start: 2025-01-14

## 2025-01-14 ASSESSMENT — ENCOUNTER SYMPTOMS
ABDOMINAL DISTENTION: 0
FATIGUE: 1
SHORTNESS OF BREATH: 0

## 2025-01-14 NOTE — PROGRESS NOTES
Subjective   Patient ID: Trenton Sandra is a 74 y.o. male who presents for Follow-up (6 mo fuv).  HPI  Patient with history of seronegative inflammatory arthritis.  Previously had been on Plaquenil.    Patient was last seen in July and at that time we had him continue on methotrexate 0.9 mL weekly.  He has had some left shoulder issue has some discomfort radiating to his and it can be all the time.  He has to be careful on how injury to that shoulder.  The rest of his joints are doing okay.  He has always had some chronic issues by his left hip and lower back.  He does use Biofreeze which has been helpful.    Usually will take acetaminophen 1000 mg twice daily.  He has a history of previous peptic ulcer disease x 2.  He is currently on omeprazole.  Denies any infections.  Nothing new with his other providers. Is not on any new medications.  Review of Systems   Constitutional:  Positive for fatigue.   Respiratory:  Negative for shortness of breath.    Cardiovascular:  Negative for chest pain.   Gastrointestinal:  Negative for abdominal distention.   Musculoskeletal:         Per HPI       Objective   Physical Exam  GEN: NAD A&O x3 appropriate affect  HENT: no enlarged glands or thyroid  EYES: no conjunctival redness, eyelids normal,  LYMPH: no cervical lymphadenopathy  SKIN: no rashes  PULSES: +radials  MSK:   No current swelling in the MCPs or PIPs mild decreased range of motion of the wrist. No current tenderness in the elbows   Tenderness along the left biceps tendon  Assessment/Plan     inflammatory arthritis -previously had been on Plaquenil, Humira and Xeljanz.   -Currently on 0.9 mL of methotrexate.  Will have him do blood work to monitor toxicity of medication efficacy.  At this time I feel that he has no swollen or tender joints and that his rheumatoid is under control  Left biceps tendinitis   -Patient given information and exercises to do.  He does not like going to physical therapy.      We can  see him back again in 6 months or as needed.          Seda Huang MD 01/14/25 9:23 AM

## 2025-01-19 DIAGNOSIS — M19.90 INFLAMMATORY ARTHRITIS: ICD-10-CM

## 2025-01-20 DIAGNOSIS — Z79.899 ENCOUNTER FOR LONG-TERM (CURRENT) USE OF MEDICATIONS: ICD-10-CM

## 2025-01-20 DIAGNOSIS — M19.90 INFLAMMATORY ARTHRITIS: Primary | ICD-10-CM

## 2025-01-20 RX ORDER — METHOTREXATE 25 MG/ML
INJECTION INTRA-ARTERIAL; INTRAMUSCULAR; INTRATHECAL; INTRAVENOUS
Qty: 24 ML | Refills: 1 | Status: SHIPPED | OUTPATIENT
Start: 2025-01-20

## 2025-05-20 DIAGNOSIS — M19.90 UNSPECIFIED OSTEOARTHRITIS, UNSPECIFIED SITE: ICD-10-CM

## 2025-05-20 RX ORDER — FOLIC ACID 1 MG/1
1 TABLET ORAL DAILY
Qty: 90 TABLET | Refills: 3 | Status: SHIPPED | OUTPATIENT
Start: 2025-05-20

## 2025-06-10 DIAGNOSIS — E78.5 DYSLIPIDEMIA: ICD-10-CM

## 2025-06-10 RX ORDER — PRAVASTATIN SODIUM 20 MG/1
20 TABLET ORAL DAILY
Qty: 90 TABLET | Refills: 0 | Status: SHIPPED | OUTPATIENT
Start: 2025-06-10

## 2025-06-20 RX ORDER — METHOTREXATE 25 MG/ML
INJECTION, SOLUTION INTRAMUSCULAR; INTRATHECAL; INTRAVENOUS; SUBCUTANEOUS
COMMUNITY
Start: 2025-04-08

## 2025-06-24 ENCOUNTER — OFFICE VISIT (OUTPATIENT)
Dept: PRIMARY CARE | Facility: CLINIC | Age: 75
End: 2025-06-24
Payer: MEDICARE

## 2025-06-24 VITALS
SYSTOLIC BLOOD PRESSURE: 138 MMHG | DIASTOLIC BLOOD PRESSURE: 78 MMHG | HEIGHT: 70 IN | BODY MASS INDEX: 30.32 KG/M2 | OXYGEN SATURATION: 96 % | WEIGHT: 211.8 LBS | TEMPERATURE: 97.5 F | HEART RATE: 76 BPM

## 2025-06-24 DIAGNOSIS — N40.0 BENIGN PROSTATIC HYPERPLASIA, UNSPECIFIED WHETHER LOWER URINARY TRACT SYMPTOMS PRESENT: ICD-10-CM

## 2025-06-24 DIAGNOSIS — E78.2 MIXED HYPERLIPIDEMIA: ICD-10-CM

## 2025-06-24 DIAGNOSIS — R73.9 HYPERGLYCEMIA: ICD-10-CM

## 2025-06-24 DIAGNOSIS — Z00.00 ROUTINE GENERAL MEDICAL EXAMINATION AT HEALTH CARE FACILITY: Primary | ICD-10-CM

## 2025-06-24 PROBLEM — M25.559 HIP PAIN: Status: ACTIVE | Noted: 2025-06-24

## 2025-06-24 PROBLEM — M19.011 ARTHRITIS OF RIGHT ACROMIOCLAVICULAR JOINT: Status: ACTIVE | Noted: 2025-06-24

## 2025-06-24 PROCEDURE — 3008F BODY MASS INDEX DOCD: CPT | Performed by: FAMILY MEDICINE

## 2025-06-24 PROCEDURE — 1159F MED LIST DOCD IN RCRD: CPT | Performed by: FAMILY MEDICINE

## 2025-06-24 PROCEDURE — 99215 OFFICE O/P EST HI 40 MIN: CPT | Performed by: FAMILY MEDICINE

## 2025-06-24 PROCEDURE — G0439 PPPS, SUBSEQ VISIT: HCPCS | Performed by: FAMILY MEDICINE

## 2025-06-24 PROCEDURE — 99214 OFFICE O/P EST MOD 30 MIN: CPT | Performed by: FAMILY MEDICINE

## 2025-06-24 PROCEDURE — 1036F TOBACCO NON-USER: CPT | Performed by: FAMILY MEDICINE

## 2025-06-24 PROCEDURE — 99214 OFFICE O/P EST MOD 30 MIN: CPT | Mod: 25 | Performed by: FAMILY MEDICINE

## 2025-06-24 PROCEDURE — 1125F AMNT PAIN NOTED PAIN PRSNT: CPT | Performed by: FAMILY MEDICINE

## 2025-06-24 PROCEDURE — 1160F RVW MEDS BY RX/DR IN RCRD: CPT | Performed by: FAMILY MEDICINE

## 2025-06-24 PROCEDURE — 1170F FXNL STATUS ASSESSED: CPT | Performed by: FAMILY MEDICINE

## 2025-06-24 ASSESSMENT — ACTIVITIES OF DAILY LIVING (ADL)
BATHING: INDEPENDENT
DOING_HOUSEWORK: INDEPENDENT
MANAGING_FINANCES: INDEPENDENT
TAKING_MEDICATION: INDEPENDENT
GROCERY_SHOPPING: INDEPENDENT
DRESSING: INDEPENDENT

## 2025-06-24 ASSESSMENT — PATIENT HEALTH QUESTIONNAIRE - PHQ9
1. LITTLE INTEREST OR PLEASURE IN DOING THINGS: NOT AT ALL
SUM OF ALL RESPONSES TO PHQ9 QUESTIONS 1 AND 2: 0
2. FEELING DOWN, DEPRESSED OR HOPELESS: NOT AT ALL

## 2025-06-24 ASSESSMENT — PAIN SCALES - GENERAL: PAINLEVEL_OUTOF10: 5

## 2025-06-24 NOTE — PROGRESS NOTES
"Subjective   Reason for Visit: Trenton Sandra is an 75 y.o. male here for a Medicare Wellness visit.     Past Medical, Surgical, and Family History reviewed and updated in chart.  Medical History[1]    Reviewed all medications by prescribing practitioner or clinical pharmacist (such as prescriptions, OTCs, herbal therapies and supplements) and documented in the medical record.  Current Medications[2]    HPI here for subsequent medicare physical  Psa due  Colorectal due 2026    Patient Care Team:  Sujata Rubio MD as PCP - General (Family Medicine)     Review of Systems n/a    Objective   Vitals:  /78   Pulse 76   Temp 36.4 °C (97.5 °F)   Ht 1.772 m (5' 9.75\")   Wt 96.1 kg (211 lb 12.8 oz)   SpO2 96%   BMI 30.61 kg/m²       Physical Exam  Vitals reviewed.   Constitutional:       Appearance: Normal appearance.   HENT:      Head: Normocephalic and atraumatic.      Right Ear: Tympanic membrane, ear canal and external ear normal.      Left Ear: Tympanic membrane, ear canal and external ear normal.      Mouth/Throat:      Mouth: Mucous membranes are moist.      Pharynx: Oropharynx is clear.   Eyes:      Extraocular Movements: Extraocular movements intact.      Pupils: Pupils are equal, round, and reactive to light.   Cardiovascular:      Rate and Rhythm: Normal rate and regular rhythm.      Heart sounds: Normal heart sounds.   Pulmonary:      Effort: Pulmonary effort is normal.      Breath sounds: Normal breath sounds.   Abdominal:      Tenderness: There is no right CVA tenderness or left CVA tenderness.   Lymphadenopathy:      Cervical: No cervical adenopathy.   Skin:     General: Skin is warm.   Neurological:      General: No focal deficit present.      Mental Status: He is alert.      Motor: Motor function is intact.      Coordination: Coordination is intact.      Deep Tendon Reflexes: Reflexes are normal and symmetric.   Psychiatric:         Mood and Affect: Mood normal.         Assessment & " Plan  Routine general medical examination at health care facility    Orders:    1 Year Follow Up In Primary Care - Wellness Exam; Future    Mixed hyperlipidemia  Cont pravastatin  Orders:    Lipid Panel; Future    Hyperglycemia    Orders:    Hemoglobin A1C; Future    Benign prostatic hyperplasia, unspecified whether lower urinary tract symptoms present    Orders:    Prostate Specific Antigen; Future                   [1]   Past Medical History:  Diagnosis Date    Arthritis 2013    BPH (benign prostatic hyperplasia)     [place]    COVID-19 04/13/2024    Dyslipidemia     History of COVID-19     History of duodenal ulcer     History of iron deficiency anemia     History of upper gastrointestinal bleeding     Kidney stone     left; [place]    Leukopenia 04/13/2024    Osteopenia     [place]    Peptic ulceration     PMR (polymyalgia rheumatica) (Multi)     [place]    Rheumatoid arthritis     [place]   [2]   Current Outpatient Medications   Medication Sig Dispense Refill    acetaminophen (Tylenol 8 HOUR) 650 mg ER tablet Take 1 tablet (650 mg) by mouth 2 times a day. Do not crush, chew, or split.      folic acid (Folvite) 1 mg tablet TAKE 1 TABLET BY MOUTH EVERY DAY 90 tablet 3    methotrexate 25 mg/mL injection INJECT 0.9ML SUBCUTANEOUS ONCE A WEEK      methotrexate 25 mg/mL INJECT 0.9ML SUBCUTANEOUS ONCE A WEEK 24 mL 1    omeprazole (PriLOSEC) 40 mg DR capsule TAKE 1 CAPSULE BY MOUTH ONCE A DAY 90 capsule 3    pravastatin (Pravachol) 20 mg tablet TAKE 1 TABLET BY MOUTH EVERY DAY 90 tablet 0     No current facility-administered medications for this visit.

## 2025-06-24 NOTE — PATIENT INSTRUCTIONS
PAIN MANAGEMENT IN THE ELDERLY  What is pain? Pain is how your body reacts to injury or illness, even as you get older. Pain is not something that normally happens as you age. What you think is painful may not be painful to someone else. Everybody reacts to pain in different ways. But, pain is whatever you say it is! No matter how mild or strong your pain is, you have the right to be “comfortable”. But you need to tell your caregiver that you have pain, so together you can work on treatment options.   The following are some things that are NOT true about getting older and pain.  I should expect to have pain because it is just a part of getting older.  If I tell my caregiver about my pain, he will think I am complaining. You have the right just as anyone else does to be comfortable. Your caregiver cannot treat your pain if you do not tell him about it.  If I tell my caregiver about my pain he will not listen or take me seriously.  If I take pain medicines, I will feel “drugged up” or “doped up”. There are many new medicines now that lessen pain without making you feel “drugged up” when taking them.  Older people should only take pain medicines if their pain is very bad. You have the right to be comfortable no matter if you have a little or a lot of pain.  Pain medicines are addictive and if I take them I will get “hooked”. An addicted person is someone who takes medicines to “get high”. You cannot get addicted to pain medicines if you are taking them to make your pain go away. A person who has diabetes takes their medicine to keep the diabetes I control. It is the same with your pain. You take pain medicines to keep your pain in control and to live a normal life.  What causes pain? Pain can be caused by many things, such as injury, surgery, or disease. Some pain is caused by pressure on a nerve, such as a cancerous tumor. Other pain is caused when nerves are cut as I an accident or surgery. Old injuries that may not  have bothered you may get re?injured, or cause new injuries. You may not want to move the painful part of your body at all. But, you may have pain because you are not moving this body part. But sometimes there is no clear cause for pain.    What are the different types of pain?  Acute pain is short?lived and usually lasts less than 3 months. Caregivers first work to remove the cause of the pain, such fixing a broken arm or leg. Acute pain can usually be controlled or stopped with pain medicine.  Chronic pain lasts longer than 3 to 6 months. Almost 4 out of 5 persons over 65 regularly take medications for chronic pain, and half of this group has seen more than 3 doctors in the past 5 years. This kind of pain is often more complex. Caregivers may use medicines along with other treatments, like physical and relaxation therapies to help your pain.    What is your pain like? Caregivers want you to talk to them about your pain. This helps them learn what may be causing the pain and how to best treat it. You need to tell your caregivers if you have trouble hearing their questions or seeing things. Caregivers can use special tools and ways to better understand their questions about your pain.    What if I cannot talk? Sometimes you may not be able to speak about your pain. You may have illness or injuries like dementia, brain damage, or a stroke. This makes it very hard for your caregivers and family to know you are in pain. Your family may help caregivers understand your pain by watching for physical signs of pain. This means you may act normal or opposite of how your family thinks you should act even though you are having very bad pain.     The following are some signs that your family can watch for that may tell them you are in pain.  If you are normally loud and noisy, you may get very quiet and withdrawn. You may also stop doing activities you used to do.  If you are very quiet and withdrawn, you may get loud, act  stubborn, and hit people.  You may not eat what you normally do. Or, you may only want to drink or eat soft foods.  Suddenly, you may not do all the activities you used to do.  You may lose control of your bowel or bladder.  You may be very depressed and have a sad face.  If you do not talk at all, you may blink your eyes very fast much of the time. You may also grimace.  If you have been very easy going and happy, you may begin to be very sensitive and cry easily.  You might start to walk or move differently than before. You may suddenly stop walking or start pacing all the time.  You may have your knees drawn up to your chest and rock like a baby.  You may touch, rub, pull or pick at a body part that is hurting.  You may start to pull away from peoples’ touch and protect your arms and legs.  You may suddenly begin to fall when you had no problems before.  You may sleep more than usual.  You may start to whimper or groan quietly.  You may become very confused suddenly when there was no problem before.  Why is pain control important? Pain can affect your appetite, how well you sleep, your energy level and ability to do things. Pain can also affect your mood and relationship with others. If caregivers can help you control your pain, you will suffer less and can even heal faster.  Medicines:  Anti?anxiety medicine: this medicine may be given to help you feel less nervous. It may be given by IV, as a shot/injection or by mouth.  Anti?convulsing: this medicine is given to control seizures. It can also be used to treat kinds of chronic nerve pain and may help control your mood swings. It is given by IV, shot/injection or by mouth.  Muscle relaxers: you may need medicine to help your muscles relax. This medicine can be given by IV, shot/injection or by mouth. Muscle relaxers can make you feel dizzy or weak. Call your caregiver if you need help getting out of bed.  NSAIDS: this medicine may be given to decrease  inflammation, which is redness, pain and swelling. It is very good for chronic bone pain that comes from arthritis or cancer. It is given by mouth or inhaled in your nose.  Pain medicine: this medicine affects the nervous system so you feel less pain. Your caregiver will tell you how much to take and how often. Take the medicine regularly as directed by your  caregiver. Do not wait until the pain is too bad. The medicine may not work as well at controlling the pain if you wait too long to take it. Tell caregivers if the pain does not go away or comes back.  Steroids: this medicine may be given to decrease inflammation. There are many different reasons to take steroids. This medication can help a lot but may also have side effects. Be sure  you understand why you need steroids. Don’t stop taking this medicine without your caregiver’s ok. Stopping on your own can cause problems.  Tell your caregiver all medications that you are taking, including over the counter meds and herbals.    How can pain medicine be given? The following are the many different ways pain medicine can be given depending on the kind of pain you are having.  By mouth - you may be given pills or liquid to swallow or you may be given a pill or liquid to put under your tongue. Some medicine can also be given as a lozenge like a cough drop or even as a special lollipop.  Rectal - medicine in a suppository is put into your rectum.  IV - pain medicine can be given as a shot/injection in an IV, into a muscle, or under the skin.  Transdermal - some medicine can be given as a patch that is placed on your skin. This medicine is released slowly to give pain relief for as long as 72 hours.  How can you take pain medicine safely and make it work best for you?  DO NOT wait until you are in pain to take your medicine if your caregiver has suggested a regular schedule around the clock. If you wait until you feel pain, you will only be “chasing” your pain and not  controlling it.  Some pain medicines can make you breathe less deeply and less often. The medicine may also make you sleepy, dizzy, and unsafe to drive a car or use heavy equipment. For these reasons, it is very important to follow your caregiver’s advice on how to use this medicine.  Some foods, alcohol and other medicines may cause unpleasant side effects when you take pain medicine. Follow your caregiver’s advice about how to prevent these problems.  Pain medicine and NSAIDS can make you constipated. Contact your caregiver if you are experiencing constipation.  Do not stop talking pain medicine suddenly if you have been taking it longer than 2 weeks. Your body may have become used to the medicine. Stopping the medicine all at once may cause unpleasant or dangerous side effects.  With time you may feel that the pain medicine is not working as well as it did before. Call your caregiver if this happens.   Other non?drug control methods: there are many pain control techniques that can help you deal with pain even if it does not go away completely. It is important to practice some of the techniques whe you do not have pain if possible. This will help the technique work well during an attack of pain.  Breathing exercises.  Environment: being in a quiet place may make it easier for you to deal with the pain. Avoiding bright lights or loud, noisy places can also help control the pain. Making sure your home is not too hot or too cold may also lessen pain.

## 2025-06-25 LAB
CHOLEST SERPL-MCNC: 153 MG/DL
CHOLEST/HDLC SERPL: 3.9 (CALC)
EST. AVERAGE GLUCOSE BLD GHB EST-MCNC: 111 MG/DL
EST. AVERAGE GLUCOSE BLD GHB EST-SCNC: 6.2 MMOL/L
HBA1C MFR BLD: 5.5 %
HDLC SERPL-MCNC: 39 MG/DL
LDLC SERPL CALC-MCNC: 91 MG/DL (CALC)
NONHDLC SERPL-MCNC: 114 MG/DL (CALC)
PSA SERPL-MCNC: 0.65 NG/ML
TRIGL SERPL-MCNC: 131 MG/DL

## 2025-07-11 DIAGNOSIS — M19.90 INFLAMMATORY ARTHRITIS: ICD-10-CM

## 2025-07-11 RX ORDER — METHOTREXATE 25 MG/ML
INJECTION INTRA-ARTERIAL; INTRAMUSCULAR; INTRATHECAL; INTRAVENOUS
Qty: 24 ML | Refills: 1 | Status: SHIPPED | OUTPATIENT
Start: 2025-07-11

## 2025-07-15 ENCOUNTER — APPOINTMENT (OUTPATIENT)
Dept: RHEUMATOLOGY | Facility: CLINIC | Age: 75
End: 2025-07-15
Payer: MEDICARE

## 2025-07-15 VITALS
OXYGEN SATURATION: 96 % | HEART RATE: 79 BPM | HEIGHT: 71 IN | BODY MASS INDEX: 30.27 KG/M2 | DIASTOLIC BLOOD PRESSURE: 90 MMHG | WEIGHT: 216.2 LBS | SYSTOLIC BLOOD PRESSURE: 167 MMHG

## 2025-07-15 DIAGNOSIS — M19.90 INFLAMMATORY ARTHRITIS: Primary | ICD-10-CM

## 2025-07-15 DIAGNOSIS — Z79.899 ENCOUNTER FOR LONG-TERM (CURRENT) USE OF MEDICATIONS: ICD-10-CM

## 2025-07-15 PROCEDURE — 1159F MED LIST DOCD IN RCRD: CPT | Performed by: INTERNAL MEDICINE

## 2025-07-15 PROCEDURE — 99213 OFFICE O/P EST LOW 20 MIN: CPT | Performed by: INTERNAL MEDICINE

## 2025-07-15 PROCEDURE — 1125F AMNT PAIN NOTED PAIN PRSNT: CPT | Performed by: INTERNAL MEDICINE

## 2025-07-15 PROCEDURE — 3008F BODY MASS INDEX DOCD: CPT | Performed by: INTERNAL MEDICINE

## 2025-07-15 PROCEDURE — G2211 COMPLEX E/M VISIT ADD ON: HCPCS | Performed by: INTERNAL MEDICINE

## 2025-07-15 ASSESSMENT — ENCOUNTER SYMPTOMS
ABDOMINAL DISTENTION: 0
SHORTNESS OF BREATH: 0
FATIGUE: 1

## 2025-07-15 ASSESSMENT — PAIN SCALES - GENERAL: PAINLEVEL_OUTOF10: 3

## 2025-07-15 NOTE — PROGRESS NOTES
Subjective   Patient ID: Trenton Sandra is a 75 y.o. male who presents for Follow-up (6 month RA follow up).  HPI  Patient with history of seronegative inflammatory arthritis.  Previously had been on Plaquenil.    Patient was last seen in January.  At that time we had him continue on methotrexate.  He just has an allover ache.  His lower back had bothered him but is getting a little bit better.  The shoulder has not really bothered him.  He does take acetaminophen for his symptoms.  He still busy with the yard and also with his shop.  He feels that his inflammatory arthritis is doing okay.    Denies any infections since last seen.  Denies any breathing or GI issues.    Review of Systems   Constitutional:  Positive for fatigue.   Respiratory:  Negative for shortness of breath.    Cardiovascular:  Negative for chest pain.   Gastrointestinal:  Negative for abdominal distention.   Musculoskeletal:         Per HPI       Objective   Physical Exam  GEN: NAD A&O x3 appropriate affect  HENT: no enlarged glands or thyroid  EYES: no conjunctival redness, eyelids normal,  LYMPH: no cervical lymphadenopathy  SKIN: no rashes  PULSES: +radials  MSK:   No current swelling in the MCPs or PIPs mild decreased range of motion of the wrist. No current tenderness in the elbows   Paraspinal tenderness of the lower spine  Assessment/Plan     inflammatory arthritis -previously had been on Plaquenil, Humira and Xeljanz.   -Currently on 0.9 mL of methotrexate.  Will have him do blood work to monitor drug toxicity and disease activity.  Currently having more symptoms in his lower spine.  Has deferred physical therapy in the past.     We can see him back again in 6 months or as needed.          Seda Huang MD 07/15/25 9:39 AM

## 2025-07-16 LAB
ALBUMIN SERPL-MCNC: 4.3 G/DL (ref 3.6–5.1)
ALP SERPL-CCNC: 80 U/L (ref 35–144)
ALT SERPL-CCNC: 28 U/L (ref 9–46)
ANION GAP SERPL CALCULATED.4IONS-SCNC: 8 MMOL/L (CALC) (ref 7–17)
AST SERPL-CCNC: 26 U/L (ref 10–35)
BILIRUB SERPL-MCNC: 0.7 MG/DL (ref 0.2–1.2)
BUN SERPL-MCNC: 23 MG/DL (ref 7–25)
CALCIUM SERPL-MCNC: 8.7 MG/DL (ref 8.6–10.3)
CHLORIDE SERPL-SCNC: 108 MMOL/L (ref 98–110)
CO2 SERPL-SCNC: 29 MMOL/L (ref 20–32)
CREAT SERPL-MCNC: 0.88 MG/DL (ref 0.7–1.28)
CRP SERPL-MCNC: 4.3 MG/L
EGFRCR SERPLBLD CKD-EPI 2021: 90 ML/MIN/1.73M2
ERYTHROCYTE [DISTWIDTH] IN BLOOD BY AUTOMATED COUNT: 15.7 % (ref 11–15)
ERYTHROCYTE [SEDIMENTATION RATE] IN BLOOD BY WESTERGREN METHOD: 11 MM/H
GLUCOSE SERPL-MCNC: 116 MG/DL (ref 65–99)
HCT VFR BLD AUTO: 44.9 % (ref 38.5–50)
HGB BLD-MCNC: 13.6 G/DL (ref 13.2–17.1)
MCH RBC QN AUTO: 29.8 PG (ref 27–33)
MCHC RBC AUTO-ENTMCNC: 30.3 G/DL (ref 32–36)
MCV RBC AUTO: 98.2 FL (ref 80–100)
PLATELET # BLD AUTO: 126 THOUSAND/UL (ref 140–400)
PMV BLD REES-ECKER: 11.7 FL (ref 7.5–12.5)
POTASSIUM SERPL-SCNC: 4 MMOL/L (ref 3.5–5.3)
PROT SERPL-MCNC: 7.3 G/DL (ref 6.1–8.1)
RBC # BLD AUTO: 4.57 MILLION/UL (ref 4.2–5.8)
SODIUM SERPL-SCNC: 145 MMOL/L (ref 135–146)
WBC # BLD AUTO: 4.1 THOUSAND/UL (ref 3.8–10.8)

## 2025-09-07 DIAGNOSIS — E78.5 DYSLIPIDEMIA: ICD-10-CM

## 2025-09-07 RX ORDER — PRAVASTATIN SODIUM 20 MG/1
20 TABLET ORAL DAILY
Qty: 90 TABLET | Refills: 2 | Status: SHIPPED | OUTPATIENT
Start: 2025-09-07

## 2026-01-13 ENCOUNTER — APPOINTMENT (OUTPATIENT)
Dept: RHEUMATOLOGY | Facility: CLINIC | Age: 76
End: 2026-01-13
Payer: MEDICARE